# Patient Record
Sex: FEMALE | Race: WHITE | NOT HISPANIC OR LATINO | URBAN - METROPOLITAN AREA
[De-identification: names, ages, dates, MRNs, and addresses within clinical notes are randomized per-mention and may not be internally consistent; named-entity substitution may affect disease eponyms.]

---

## 2020-07-10 ENCOUNTER — OFFICE VISIT (OUTPATIENT)
Dept: URBAN - METROPOLITAN AREA CLINIC 63 | Facility: CLINIC | Age: 85
End: 2020-07-10

## 2020-10-15 ENCOUNTER — OFFICE VISIT (OUTPATIENT)
Dept: URBAN - METROPOLITAN AREA CLINIC 121 | Facility: CLINIC | Age: 85
End: 2020-10-15

## 2021-03-26 ENCOUNTER — OFFICE VISIT (OUTPATIENT)
Dept: URBAN - METROPOLITAN AREA CLINIC 63 | Facility: CLINIC | Age: 86
End: 2021-03-26

## 2021-04-15 ENCOUNTER — OFFICE VISIT (OUTPATIENT)
Dept: URBAN - METROPOLITAN AREA SURGERY CENTER 4 | Facility: SURGERY CENTER | Age: 86
End: 2021-04-15

## 2021-04-22 ENCOUNTER — OFFICE VISIT (OUTPATIENT)
Dept: URBAN - METROPOLITAN AREA SURGERY CENTER 4 | Facility: SURGERY CENTER | Age: 86
End: 2021-04-22

## 2021-04-27 LAB — PATHOLOGY (INDENTED REPORT): (no result)

## 2021-04-30 ENCOUNTER — OFFICE VISIT (OUTPATIENT)
Dept: URBAN - METROPOLITAN AREA CLINIC 63 | Facility: CLINIC | Age: 86
End: 2021-04-30

## 2022-07-09 ENCOUNTER — TELEPHONE ENCOUNTER (OUTPATIENT)
Dept: URBAN - METROPOLITAN AREA CLINIC 121 | Facility: CLINIC | Age: 87
End: 2022-07-09

## 2022-07-09 RX ORDER — LORAZEPAM 2 MG/1
TABLET ORAL TWICE A DAY
Refills: 0 | OUTPATIENT
Start: 2018-07-23 | End: 2020-07-10

## 2022-07-09 RX ORDER — DIPHENOXYLATE HCL/ATROPINE 2.5-.025MG
TABLET ORAL
Refills: 0 | OUTPATIENT
Start: 2014-11-21 | End: 2016-02-17

## 2022-07-09 RX ORDER — LEVOTHYROXINE SODIUM 112 MCG
TABLET ORAL ONCE A DAY
Refills: 0 | OUTPATIENT
Start: 2018-05-18 | End: 2018-07-23

## 2022-07-09 RX ORDER — ESTROGENS, CONJUGATED 0.625 MG
TABLET ORAL
Refills: 0 | OUTPATIENT
Start: 2012-01-12 | End: 2014-10-31

## 2022-07-09 RX ORDER — ZOLPIDEM TARTRATE 12.5 MG
TABLET, EXTENDED RELEASE MULTIPHASE ORAL
Refills: 0 | OUTPATIENT
Start: 2014-11-21 | End: 2016-02-17

## 2022-07-09 RX ORDER — HYDROCODONE BITARTRATE AND ACETAMINOPHEN 5; 325 MG/1; MG/1
TABLET ORAL
Refills: 0 | OUTPATIENT
Start: 2021-03-26 | End: 2021-04-30

## 2022-07-09 RX ORDER — METOPROLOL SUCCINATE 100 MG/1
TABLET, EXTENDED RELEASE ORAL ONCE A DAY
Refills: 0 | OUTPATIENT
Start: 2018-07-23 | End: 2020-07-10

## 2022-07-09 RX ORDER — ALPRAZOLAM 0.5 MG
TABLET ORAL
Refills: 0 | OUTPATIENT
Start: 2009-11-03 | End: 2012-01-12

## 2022-07-09 RX ORDER — ASPIRIN 81 MG/1
TABLET, COATED ORAL
Refills: 0 | OUTPATIENT
Start: 2012-01-12 | End: 2014-11-21

## 2022-07-09 RX ORDER — LEVOTHYROXINE SODIUM 112 MCG
TABLET ORAL ONCE A DAY
Refills: 0 | OUTPATIENT
Start: 2018-07-23 | End: 2020-07-10

## 2022-07-09 RX ORDER — ESOMEPRAZOLE MAGNESIUM 40 MG
CAPSULE,DELAYED RELEASE (ENTERIC COATED) ORAL
Refills: 0 | OUTPATIENT
Start: 2009-11-03 | End: 2012-01-12

## 2022-07-09 RX ORDER — METOPROLOL SUCCINATE 25 MG/1
TABLET, EXTENDED RELEASE ORAL
Refills: 0 | OUTPATIENT
Start: 2021-03-21 | End: 2021-03-26

## 2022-07-09 RX ORDER — ASPIRIN 81 MG/1
TABLET, FILM COATED ORAL ONCE A DAY
Refills: 0 | OUTPATIENT
Start: 2021-03-26 | End: 2021-04-30

## 2022-07-09 RX ORDER — GABAPENTIN 100 MG/1
CAPSULE ORAL ONCE A DAY
Refills: 0 | OUTPATIENT
Start: 2016-02-17 | End: 2016-02-17

## 2022-07-09 RX ORDER — AMLODIPINE BESYLATE 5 MG/1
TABLET ORAL ONCE A DAY
Refills: 0 | OUTPATIENT
Start: 2020-07-08 | End: 2020-07-10

## 2022-07-09 RX ORDER — ASPIRIN 81 MG/1
TABLET, COATED ORAL
Refills: 0 | OUTPATIENT
Start: 2014-11-21 | End: 2016-02-17

## 2022-07-09 RX ORDER — SERTRALINE HCL 25 MG
TABLET ORAL
Refills: 0 | OUTPATIENT
Start: 2009-11-03 | End: 2012-01-12

## 2022-07-09 RX ORDER — FLUTICASONE PROPIONATE 50 UG/1
SPRAY, METERED NASAL
Refills: 0 | OUTPATIENT
End: 2021-03-26

## 2022-07-09 RX ORDER — HYDROCODONE BITARTRATE AND ACETAMINOPHEN 5; 325 MG/1; MG/1
TABLET ORAL
Refills: 0 | OUTPATIENT
End: 2021-03-26

## 2022-07-09 RX ORDER — ESOMEPRAZOLE MAGNESIUM 40 MG
CAPSULE,DELAYED RELEASE (ENTERIC COATED) ORAL
Refills: 0 | OUTPATIENT
Start: 2012-01-12 | End: 2014-11-21

## 2022-07-09 RX ORDER — PANTOPRAZOLE SODIUM 40 MG/1
TABLET, DELAYED RELEASE ORAL
Refills: 0 | OUTPATIENT
Start: 2021-03-12 | End: 2021-03-26

## 2022-07-09 RX ORDER — METOPROLOL SUCCINATE 100 MG/1
TABLET, EXTENDED RELEASE ORAL ONCE A DAY
Refills: 0 | OUTPATIENT
Start: 2018-05-18 | End: 2018-07-23

## 2022-07-09 RX ORDER — VALSARTAN 160 MG/1
TABLET ORAL
Refills: 0 | OUTPATIENT
Start: 2014-11-21 | End: 2015-12-07

## 2022-07-09 RX ORDER — FUROSEMIDE 40 MG/1
TABLET ORAL AS NEEDED
Refills: 0 | OUTPATIENT
Start: 2018-07-23 | End: 2020-07-10

## 2022-07-09 RX ORDER — ESTROGENS, CONJUGATED 0.625 MG
TABLET ORAL
Refills: 0 | OUTPATIENT
Start: 2009-11-03 | End: 2012-01-12

## 2022-07-09 RX ORDER — ESOMEPRAZOLE MAGNESIUM 40 MG
CAPSULE,DELAYED RELEASE (ENTERIC COATED) ORAL TWICE A DAY
Refills: 0 | OUTPATIENT
Start: 2016-02-17 | End: 2018-05-18

## 2022-07-09 RX ORDER — METOPROLOL SUCCINATE 100 MG/1
TABLET, EXTENDED RELEASE ORAL
Refills: 0 | OUTPATIENT
Start: 2014-11-21 | End: 2016-02-17

## 2022-07-09 RX ORDER — LISINOPRIL 5 MG/1
TABLET ORAL
Refills: 0 | OUTPATIENT
Start: 2021-03-26 | End: 2021-03-26

## 2022-07-09 RX ORDER — FUROSEMIDE 40 MG/1
TABLET ORAL AS NEEDED
Refills: 0 | OUTPATIENT
Start: 2018-05-18 | End: 2018-07-23

## 2022-07-09 RX ORDER — FUROSEMIDE 40 MG/1
TABLET ORAL ONCE A DAY
Refills: 0 | OUTPATIENT
Start: 2014-11-21 | End: 2016-02-17

## 2022-07-09 RX ORDER — FUROSEMIDE 40 MG/1
TABLET ORAL ONCE A DAY
Refills: 0 | OUTPATIENT
Start: 2013-12-17 | End: 2014-11-21

## 2022-07-09 RX ORDER — HYDROXYZINE HYDROCHLORIDE 25 MG/1
TABLET, FILM COATED ORAL
Refills: 0 | OUTPATIENT
Start: 2021-03-08 | End: 2021-03-26

## 2022-07-09 RX ORDER — VALSARTAN 80 MG
TABLET ORAL ONCE A DAY
Refills: 0 | OUTPATIENT
Start: 2016-02-17 | End: 2018-05-18

## 2022-07-09 RX ORDER — SUCRALFATE 1 G/1
TABLET ORAL
Refills: 0 | OUTPATIENT
Start: 2021-03-17 | End: 2021-03-26

## 2022-07-09 RX ORDER — GUAIFEN/P-PROPANOLAMIN/CODEINE
EXPECTORANT ORAL
Refills: 0 | OUTPATIENT
Start: 2009-11-03 | End: 2012-01-12

## 2022-07-09 RX ORDER — METOPROLOL SUCCINATE 25 MG/1
TABLET, EXTENDED RELEASE ORAL
Refills: 0 | OUTPATIENT
Start: 2021-03-26 | End: 2021-04-30

## 2022-07-09 RX ORDER — TOLTERODINE TARTRATE 4 MG/1
CAPSULE, EXTENDED RELEASE ORAL ONCE A DAY
Refills: 0 | OUTPATIENT
Start: 2016-02-17 | End: 2018-05-18

## 2022-07-09 RX ORDER — METOPROLOL SUCCINATE 100 MG/1
TABLET, EXTENDED RELEASE ORAL ONCE A DAY
Refills: 0 | OUTPATIENT
Start: 2016-02-17 | End: 2018-05-18

## 2022-07-09 RX ORDER — LEVOTHYROXINE SODIUM 100 UG/1
TABLET ORAL
Refills: 0 | OUTPATIENT
Start: 2021-03-21 | End: 2021-03-26

## 2022-07-09 RX ORDER — TOLTERODINE TARTRATE 4 MG/1
CAPSULE, EXTENDED RELEASE ORAL ONCE A DAY
Refills: 0 | OUTPATIENT
Start: 2013-12-17 | End: 2014-11-21

## 2022-07-09 RX ORDER — HYDROXYZINE HYDROCHLORIDE 25 MG/1
TABLET, FILM COATED ORAL
Refills: 0 | OUTPATIENT
Start: 2020-06-03 | End: 2020-07-10

## 2022-07-09 RX ORDER — VALSARTAN 80 MG
TABLET ORAL
Refills: 0 | OUTPATIENT
Start: 2015-12-07 | End: 2016-02-17

## 2022-07-09 RX ORDER — LORAZEPAM 2 MG/1
TABLET ORAL TWICE A DAY
Refills: 0 | OUTPATIENT
Start: 2018-05-18 | End: 2018-07-23

## 2022-07-09 RX ORDER — LEVOTHYROXINE SODIUM 112 MCG
TABLET ORAL ONCE A DAY
Refills: 0 | OUTPATIENT
Start: 2013-12-17 | End: 2014-11-21

## 2022-07-09 RX ORDER — DIPHENOXYLATE HCL/ATROPINE 2.5-.025MG
TABLET ORAL AS NEEDED
Refills: 0 | OUTPATIENT
Start: 2016-02-17 | End: 2018-05-18

## 2022-07-09 RX ORDER — METOPROLOL SUCCINATE 25 MG/1
TABLET, EXTENDED RELEASE ORAL
Refills: 0 | OUTPATIENT
Start: 2020-06-24 | End: 2020-07-10

## 2022-07-09 RX ORDER — IPRATROPIUM BROMIDE AND ALBUTEROL 20; 100 UG/1; UG/1
SPRAY, METERED RESPIRATORY (INHALATION)
Refills: 0 | OUTPATIENT
Start: 2020-05-07 | End: 2020-07-10

## 2022-07-09 RX ORDER — LORAZEPAM 2 MG/1
TABLET ORAL TWICE A DAY
Refills: 0 | OUTPATIENT
Start: 2021-03-26 | End: 2021-04-30

## 2022-07-09 RX ORDER — TRAZODONE HYDROCHLORIDE 50 MG/1
TABLET ORAL
Refills: 0 | OUTPATIENT
Start: 2018-05-18 | End: 2018-07-23

## 2022-07-09 RX ORDER — LEVOTHYROXINE SODIUM 112 MCG
TABLET ORAL ONCE A DAY
Refills: 0 | OUTPATIENT
Start: 2014-11-21 | End: 2016-02-17

## 2022-07-09 RX ORDER — GABAPENTIN 100 MG/1
CAPSULE ORAL
Refills: 3 | OUTPATIENT
Start: 2021-03-26 | End: 2021-04-30

## 2022-07-09 RX ORDER — LEVOTHYROXINE SODIUM 125 UG/1
TABLET ORAL
Refills: 0 | OUTPATIENT
Start: 2012-01-12 | End: 2013-12-17

## 2022-07-09 RX ORDER — ASPIRIN 81 MG/1
TABLET, FILM COATED ORAL
Refills: 0 | OUTPATIENT
Start: 2018-05-18 | End: 2018-07-23

## 2022-07-09 RX ORDER — LORAZEPAM 2 MG/1
TABLET ORAL TWICE A DAY
Refills: 0 | OUTPATIENT
Start: 2016-02-17 | End: 2018-05-18

## 2022-07-09 RX ORDER — ASPIRIN 81 MG/1
TABLET, FILM COATED ORAL ONCE A DAY
Refills: 0 | OUTPATIENT
Start: 2018-07-23 | End: 2020-07-10

## 2022-07-09 RX ORDER — PANTOPRAZOLE SODIUM 40 MG/1
GRANULE, DELAYED RELEASE ORAL
Refills: 0 | OUTPATIENT
Start: 2020-06-03 | End: 2020-07-10

## 2022-07-09 RX ORDER — ZOLPIDEM TARTRATE 12.5 MG
TABLET, EXTENDED RELEASE MULTIPHASE ORAL
Refills: 0 | OUTPATIENT
Start: 2012-01-12 | End: 2014-11-21

## 2022-07-09 RX ORDER — ESOMEPRAZOLE MAGNESIUM 40 MG
CAPSULE,DELAYED RELEASE (ENTERIC COATED) ORAL
Refills: 0 | OUTPATIENT
Start: 2014-11-21 | End: 2016-02-17

## 2022-07-09 RX ORDER — TOLTERODINE TARTRATE 4 MG/1
CAPSULE, EXTENDED RELEASE ORAL ONCE A DAY
Refills: 0 | OUTPATIENT
Start: 2014-11-21 | End: 2016-02-17

## 2022-07-09 RX ORDER — LORAZEPAM 2 MG/1
TABLET ORAL TWICE A DAY
Refills: 0 | OUTPATIENT
Start: 2013-12-17 | End: 2014-11-21

## 2022-07-09 RX ORDER — CHOLESTYRAMINE 4 G/5.5G
POWDER, FOR SUSPENSION ORAL
Refills: 0 | OUTPATIENT
Start: 2020-06-18 | End: 2020-07-10

## 2022-07-09 RX ORDER — ATORVASTATIN CALCIUM 40 MG/1
TABLET, FILM COATED ORAL TAKE AS DIRECTED
Refills: 0 | OUTPATIENT
Start: 2020-01-23 | End: 2021-03-25

## 2022-07-09 RX ORDER — HYDROCODONE BITARTRATE AND ACETAMINOPHEN 325; 10 MG/1; MG/1
TABLET ORAL
Refills: 0 | OUTPATIENT
Start: 2020-05-29 | End: 2020-07-10

## 2022-07-09 RX ORDER — DIPHENOXYLATE HCL/ATROPINE 2.5-.025MG
TABLET ORAL AS NEEDED
Refills: 0 | OUTPATIENT
Start: 2018-07-23 | End: 2020-07-10

## 2022-07-09 RX ORDER — LISINOPRIL 5 MG/1
TABLET ORAL
Refills: 0 | OUTPATIENT
Start: 2017-03-11 | End: 2021-03-25

## 2022-07-09 RX ORDER — DIPHENOXYLATE HCL/ATROPINE 2.5-.025MG
TABLET ORAL
Refills: 0 | OUTPATIENT
Start: 2012-01-12 | End: 2014-11-21

## 2022-07-09 RX ORDER — ASPIRIN 81 MG/1
TABLET, COATED ORAL ONCE A DAY
Refills: 0 | OUTPATIENT
Start: 2016-02-17 | End: 2018-05-18

## 2022-07-09 RX ORDER — VALSARTAN 160 MG/1
TABLET ORAL
Refills: 0 | OUTPATIENT
Start: 2012-01-12 | End: 2014-11-21

## 2022-07-09 RX ORDER — LORAZEPAM 2 MG/1
TABLET ORAL
Refills: 0 | OUTPATIENT
Start: 2012-01-12 | End: 2013-12-17

## 2022-07-09 RX ORDER — ATORVASTATIN CALCIUM 40 MG/1
TABLET, FILM COATED ORAL TAKE AS DIRECTED
Refills: 0 | OUTPATIENT
Start: 2021-03-26 | End: 2021-04-30

## 2022-07-09 RX ORDER — TOLTERODINE TARTRATE 4 MG/1
CAPSULE, EXTENDED RELEASE ORAL
Refills: 0 | OUTPATIENT
End: 2021-03-25

## 2022-07-09 RX ORDER — LEVOTHYROXINE SODIUM 112 MCG
TABLET ORAL ONCE A DAY
Refills: 0 | OUTPATIENT
Start: 2016-02-17 | End: 2018-05-18

## 2022-07-09 RX ORDER — LORAZEPAM 2 MG/1
TABLET ORAL TWICE A DAY
Refills: 0 | OUTPATIENT
Start: 2021-03-25 | End: 2021-03-26

## 2022-07-09 RX ORDER — GABAPENTIN 100 MG/1
CAPSULE ORAL
Refills: 3 | OUTPATIENT
Start: 2016-02-10 | End: 2021-03-26

## 2022-07-09 RX ORDER — TRAZODONE HYDROCHLORIDE 50 MG/1
TABLET ORAL
Refills: 0 | OUTPATIENT
Start: 2018-07-23 | End: 2020-07-10

## 2022-07-09 RX ORDER — FUROSEMIDE 40 MG/1
TABLET ORAL AS NEEDED
Refills: 0 | OUTPATIENT
Start: 2016-02-17 | End: 2018-05-18

## 2022-07-09 RX ORDER — DIPHENOXYLATE HCL/ATROPINE 2.5-.025MG
TABLET ORAL AS NEEDED
Refills: 0 | OUTPATIENT
Start: 2018-05-18 | End: 2018-07-23

## 2022-07-09 RX ORDER — ATORVASTATIN CALCIUM 40 MG/1
TABLET, FILM COATED ORAL TAKE AS DIRECTED
Refills: 0 | OUTPATIENT
Start: 2021-03-25 | End: 2021-03-26

## 2022-07-09 RX ORDER — FLUTICASONE PROPIONATE 50 UG/1
SPRAY, METERED NASAL
Refills: 0 | OUTPATIENT
Start: 2021-03-26 | End: 2021-04-30

## 2022-07-09 RX ORDER — METOPROLOL SUCCINATE 100 MG/1
TABLET, EXTENDED RELEASE ORAL
Refills: 0 | OUTPATIENT
Start: 2012-01-12 | End: 2014-11-21

## 2022-07-09 RX ORDER — ONDANSETRON 4 MG/1
UP TO THREE TIMES A DAY, PO, AS NEEDED FOR NAUSEA TABLET, ORALLY DISINTEGRATING ORAL THREE TIMES A DAY
Refills: 1 | OUTPATIENT
Start: 2020-07-10 | End: 2021-03-25

## 2022-07-09 RX ORDER — LORAZEPAM 0.5 MG/1
TABLET ORAL
Refills: 0 | OUTPATIENT
Start: 2009-11-03 | End: 2012-01-12

## 2022-07-09 RX ORDER — LEVOTHYROXINE SODIUM 100 UG/1
TABLET ORAL
Refills: 0 | OUTPATIENT
Start: 2021-03-26 | End: 2021-04-30

## 2022-07-09 RX ORDER — FUROSEMIDE 20 MG/1
TABLET ORAL
Refills: 0 | OUTPATIENT
Start: 2020-05-21 | End: 2020-07-10

## 2022-07-09 RX ORDER — CYCLOBENZAPRINE HYDROCHLORIDE 5 MG/1
TABLET, FILM COATED ORAL
Refills: 0 | OUTPATIENT
Start: 2021-03-17 | End: 2021-03-26

## 2022-07-09 RX ORDER — ASPIRIN 81 MG/1
TABLET, COATED ORAL
Refills: 0 | OUTPATIENT
Start: 2009-11-03 | End: 2012-01-12

## 2022-07-09 RX ORDER — LORAZEPAM 2 MG/1
TABLET ORAL TWICE A DAY
Refills: 0 | OUTPATIENT
Start: 2020-07-10 | End: 2021-03-25

## 2022-07-09 RX ORDER — LEVOTHYROXINE SODIUM 100 UG/1
TABLET ORAL ONCE A DAY
Refills: 0 | OUTPATIENT
Start: 2020-06-24 | End: 2020-07-10

## 2022-07-09 RX ORDER — GABAPENTIN 100 MG/1
CAPSULE ORAL
Refills: 0 | OUTPATIENT
Start: 2016-02-17 | End: 2018-05-18

## 2022-07-09 RX ORDER — VALSARTAN 160 MG/1
TABLET ORAL
Refills: 0 | OUTPATIENT
Start: 2009-11-03 | End: 2012-01-12

## 2022-07-09 RX ORDER — PANTOPRAZOLE SODIUM 40 MG/1
TABLET, DELAYED RELEASE ORAL
Refills: 0 | OUTPATIENT
Start: 2018-07-04 | End: 2021-03-25

## 2022-07-09 RX ORDER — TOLTERODINE TARTRATE 1 MG/1
TABLET, FILM COATED ORAL
Refills: 0 | OUTPATIENT
Start: 2009-11-03 | End: 2012-01-12

## 2022-07-09 RX ORDER — SUCRALFATE 1 G/1
TABLET ORAL
Refills: 0 | OUTPATIENT
Start: 2018-07-04 | End: 2021-03-25

## 2022-07-09 RX ORDER — CEFDINIR 300 MG/1
CAPSULE ORAL
Refills: 0 | OUTPATIENT
Start: 2020-06-18 | End: 2020-07-10

## 2022-07-09 RX ORDER — LISINOPRIL 5 MG/1
TABLET ORAL
Refills: 0 | OUTPATIENT
Start: 2021-03-25 | End: 2021-03-26

## 2022-07-09 RX ORDER — LORAZEPAM 2 MG/1
TABLET ORAL TWICE A DAY
Refills: 0 | OUTPATIENT
Start: 2014-11-21 | End: 2016-02-17

## 2022-07-09 RX ORDER — ASPIRIN 81 MG/1
TABLET, FILM COATED ORAL ONCE A DAY
Refills: 0 | OUTPATIENT
Start: 2020-07-10 | End: 2021-03-26

## 2022-07-09 RX ORDER — ZOLPIDEM TARTRATE 12.5 MG
TABLET, EXTENDED RELEASE MULTIPHASE ORAL
Refills: 0 | OUTPATIENT
Start: 2016-02-17 | End: 2018-05-18

## 2022-07-09 RX ORDER — AMOXICILLIN 500 MG/1
TABLET, FILM COATED ORAL
Refills: 0 | OUTPATIENT
Start: 2014-11-21 | End: 2016-02-17

## 2022-07-10 ENCOUNTER — TELEPHONE ENCOUNTER (OUTPATIENT)
Dept: URBAN - METROPOLITAN AREA CLINIC 121 | Facility: CLINIC | Age: 87
End: 2022-07-10

## 2022-07-10 RX ORDER — AMLODIPINE BESYLATE 5 MG/1
TABLET ORAL ONCE A DAY
Refills: 0 | Status: ACTIVE | COMMUNITY
Start: 2020-07-10

## 2022-07-10 RX ORDER — SUCRALFATE 1 G/10ML
10ML AC/HS SUSPENSION ORAL
Refills: 2 | Status: ACTIVE | COMMUNITY
Start: 2012-01-12

## 2022-07-10 RX ORDER — DICLOFENAC SODIUM TOPICAL GEL, 1%, 10 MG/G
GEL TOPICAL
Refills: 5 | Status: ACTIVE | COMMUNITY
Start: 2017-11-02

## 2022-07-10 RX ORDER — HYDROCODONE BITARTRATE AND ACETAMINOPHEN 325; 10 MG/1; MG/1
TABLET ORAL
Refills: 0 | Status: ACTIVE | COMMUNITY
Start: 2020-07-10

## 2022-07-10 RX ORDER — GABAPENTIN 100 MG/1
CAPSULE ORAL
Refills: 3 | Status: ACTIVE | COMMUNITY
Start: 2021-04-30

## 2022-07-10 RX ORDER — METOPROLOL SUCCINATE 25 MG/1
TABLET, EXTENDED RELEASE ORAL
Refills: 0 | Status: ACTIVE | COMMUNITY
Start: 2020-07-10

## 2022-07-10 RX ORDER — LEVOTHYROXINE SODIUM 100 UG/1
TABLET ORAL
Refills: 0 | Status: ACTIVE | COMMUNITY
Start: 2021-04-30

## 2022-07-10 RX ORDER — HYDROCORTISONE ACETATE 25 MG
TWICE A DAY, PR, X14 DAYS SUPPOSITORY, RECTAL RECTAL TWICE A DAY
Refills: 2 | Status: ACTIVE | COMMUNITY
Start: 2021-04-30

## 2022-07-10 RX ORDER — ATORVASTATIN CALCIUM 40 MG/1
TABLET, FILM COATED ORAL TAKE AS DIRECTED
Refills: 0 | Status: ACTIVE | COMMUNITY
Start: 2021-04-30

## 2022-07-10 RX ORDER — FLUTICASONE PROPIONATE 50 UG/1
SPRAY, METERED NASAL
Refills: 0 | Status: ACTIVE | COMMUNITY
Start: 2021-04-30

## 2022-07-10 RX ORDER — ESOMEPRAZOLE MAGNESIUM 40 MG
CAPSULE,DELAYED RELEASE (ENTERIC COATED) ORAL TWICE A DAY
Refills: 3 | Status: ACTIVE | COMMUNITY
Start: 2006-03-09

## 2022-07-10 RX ORDER — CYCLOBENZAPRINE HYDROCHLORIDE 5 MG/1
TABLET, FILM COATED ORAL
Refills: 0 | Status: ACTIVE | COMMUNITY
Start: 2021-03-26

## 2022-07-10 RX ORDER — ASPIRIN 81 MG/1
TABLET, FILM COATED ORAL ONCE A DAY
Refills: 0 | Status: ACTIVE | COMMUNITY
Start: 2021-04-30

## 2022-07-10 RX ORDER — HYDROCODONE BITARTRATE AND ACETAMINOPHEN 5; 325 MG/1; MG/1
TABLET ORAL
Refills: 0 | Status: ACTIVE | COMMUNITY
Start: 2021-04-30

## 2022-07-10 RX ORDER — PANTOPRAZOLE SODIUM 40 MG/1
TABLET, DELAYED RELEASE ORAL
Refills: 0 | Status: ACTIVE | COMMUNITY
Start: 2021-03-26

## 2022-07-10 RX ORDER — HYDROXYZINE HYDROCHLORIDE 25 MG/1
TABLET, FILM COATED ORAL
Refills: 0 | Status: ACTIVE | COMMUNITY
Start: 2021-03-26

## 2022-07-10 RX ORDER — METOPROLOL SUCCINATE 25 MG/1
TABLET, EXTENDED RELEASE ORAL
Refills: 0 | Status: ACTIVE | COMMUNITY
Start: 2021-04-30

## 2022-07-10 RX ORDER — PANTOPRAZOLE SODIUM 40 MG/1
GRANULE, DELAYED RELEASE ORAL ONCE A DAY
Refills: 0 | Status: ACTIVE | COMMUNITY
Start: 2020-07-10

## 2022-07-10 RX ORDER — SUCRALFATE 1 G/10ML
AC/HS 1 GM=10ML SUSPENSION ORAL
Refills: 3 | Status: ACTIVE | COMMUNITY
Start: 2006-03-09

## 2022-07-10 RX ORDER — LORAZEPAM 2 MG/1
TABLET ORAL TWICE A DAY
Refills: 0 | Status: ACTIVE | COMMUNITY
Start: 2021-04-30

## 2022-07-10 RX ORDER — FIDAXOMICIN 200 MG/1
TAKE AS DIRECTED TAKE ONE TAB TWICE DAILY X5 DAYS, THEN 1 TAB EOD, STARTING ON DAY 7, UNTIL COMPLETED TABLET, FILM COATED ORAL TAKE AS DIRECTED
Refills: 0 | Status: ACTIVE | COMMUNITY
Start: 2021-04-08

## 2022-07-10 RX ORDER — FUROSEMIDE 20 MG/1
TABLET ORAL
Refills: 0 | Status: ACTIVE | COMMUNITY
Start: 2020-07-10

## 2022-07-10 RX ORDER — LEVOTHYROXINE SODIUM 100 UG/1
TABLET ORAL ONCE A DAY
Refills: 0 | Status: ACTIVE | COMMUNITY
Start: 2020-07-10

## 2022-07-10 RX ORDER — SUCRALFATE 1 G/1
TABLET ORAL
Refills: 0 | Status: ACTIVE | COMMUNITY
Start: 2021-03-26

## 2022-08-23 ENCOUNTER — CLAIMS CREATED FROM THE CLAIM WINDOW (OUTPATIENT)
Dept: URBAN - METROPOLITAN AREA MEDICAL CENTER 14 | Facility: MEDICAL CENTER | Age: 87
End: 2022-08-23
Payer: MEDICARE

## 2022-08-23 DIAGNOSIS — K80.50 CALCULUS OF BILE DUCT WITHOUT CHOLANGITIS OR BILIARY OBSTRUCTION: ICD-10-CM

## 2022-08-23 DIAGNOSIS — K21.9 ACID REFLUX: ICD-10-CM

## 2022-08-23 DIAGNOSIS — R55 SYNCOPAL EPISODES: ICD-10-CM

## 2022-08-23 DIAGNOSIS — R94.5 ABNORMAL LFT'S: ICD-10-CM

## 2022-08-23 DIAGNOSIS — R10.11 ABDOMINAL PAIN, RUQ: ICD-10-CM

## 2022-08-23 PROCEDURE — 43273 ENDOSCOPIC PANCREATOSCOPY: CPT | Performed by: INTERNAL MEDICINE

## 2022-08-23 PROCEDURE — 43260 ERCP W/SPECIMEN COLLECTION: CPT | Performed by: INTERNAL MEDICINE

## 2022-08-23 PROCEDURE — 99223 1ST HOSP IP/OBS HIGH 75: CPT | Performed by: INTERNAL MEDICINE

## 2022-08-23 PROCEDURE — 99232 SBSQ HOSP IP/OBS MODERATE 35: CPT | Performed by: INTERNAL MEDICINE

## 2022-08-27 ENCOUNTER — CLAIMS CREATED FROM THE CLAIM WINDOW (OUTPATIENT)
Dept: URBAN - METROPOLITAN AREA MEDICAL CENTER 14 | Facility: MEDICAL CENTER | Age: 87
End: 2022-08-27
Payer: MEDICARE

## 2022-08-27 DIAGNOSIS — R94.5 ABNORMAL LFT'S: ICD-10-CM

## 2022-08-27 DIAGNOSIS — K85.90 ACUTE PANCREATITIS: ICD-10-CM

## 2022-08-27 PROCEDURE — 99232 SBSQ HOSP IP/OBS MODERATE 35: CPT | Performed by: NURSE PRACTITIONER

## 2022-08-29 ENCOUNTER — CLAIMS CREATED FROM THE CLAIM WINDOW (OUTPATIENT)
Dept: URBAN - METROPOLITAN AREA MEDICAL CENTER 14 | Facility: MEDICAL CENTER | Age: 87
End: 2022-08-29
Payer: MEDICARE

## 2022-08-29 DIAGNOSIS — K85.90 ACUTE PANCREATITIS: ICD-10-CM

## 2022-08-29 DIAGNOSIS — R94.5 ABNORMAL LFT'S: ICD-10-CM

## 2022-08-29 PROCEDURE — 99233 SBSQ HOSP IP/OBS HIGH 50: CPT | Performed by: NURSE PRACTITIONER

## 2022-12-14 PROBLEM — 698065002 ACID REFLUX: Status: ACTIVE | Noted: 2022-12-14

## 2022-12-20 ENCOUNTER — CLAIMS CREATED FROM THE CLAIM WINDOW (OUTPATIENT)
Dept: URBAN - METROPOLITAN AREA MEDICAL CENTER 14 | Facility: MEDICAL CENTER | Age: 87
End: 2022-12-20
Payer: MEDICARE

## 2022-12-20 DIAGNOSIS — R79.89 ELEVATED LFTS: ICD-10-CM

## 2022-12-20 DIAGNOSIS — R10.9 ABDOMINAL PAIN: ICD-10-CM

## 2022-12-20 DIAGNOSIS — R53.1 WEAKNESS AND FATIGUE: ICD-10-CM

## 2022-12-20 DIAGNOSIS — R74.01 TRANSAMINITIS: ICD-10-CM

## 2022-12-20 DIAGNOSIS — N17.9 ACUTE KIDNEY INJURY: ICD-10-CM

## 2022-12-20 PROCEDURE — 99223 1ST HOSP IP/OBS HIGH 75: CPT | Performed by: NURSE PRACTITIONER

## 2022-12-20 PROCEDURE — 99233 SBSQ HOSP IP/OBS HIGH 50: CPT | Performed by: NURSE PRACTITIONER

## 2023-01-13 ENCOUNTER — OFFICE VISIT (OUTPATIENT)
Dept: URBAN - METROPOLITAN AREA CLINIC 63 | Facility: CLINIC | Age: 88
End: 2023-01-13

## 2023-01-13 RX ORDER — ATORVASTATIN CALCIUM 40 MG/1
TABLET, FILM COATED ORAL
Qty: 90 TABLET | Status: ACTIVE | COMMUNITY

## 2023-01-13 RX ORDER — NITROFURANTOIN 25; 75 MG/1; MG/1
CAPSULE ORAL
Qty: 10 CAPSULE | Status: ACTIVE | COMMUNITY

## 2023-01-13 RX ORDER — POTASSIUM CHLORIDE 750 MG/1
TABLET, FILM COATED, EXTENDED RELEASE ORAL
Qty: 90 TABLET | Status: ACTIVE | COMMUNITY

## 2023-01-13 RX ORDER — METOPROLOL SUCCINATE 100 MG/1
TABLET, EXTENDED RELEASE ORAL
Qty: 90 TABLET | Status: ACTIVE | COMMUNITY

## 2023-01-13 RX ORDER — FLUOXETINE 10 MG/1
CAPSULE ORAL
Qty: 90 CAPSULE | Status: ACTIVE | COMMUNITY

## 2023-01-13 RX ORDER — LORAZEPAM 2 MG/1
TABLET ORAL
Qty: 90 TABLET | Status: ACTIVE | COMMUNITY

## 2023-01-13 RX ORDER — LEVOTHYROXINE SODIUM 112 UG/1
CAPSULE ORAL
Qty: 90 CAPSULE | Status: ACTIVE | COMMUNITY

## 2023-01-13 RX ORDER — CYCLOBENZAPRINE HYDROCHLORIDE 5 MG/1
TABLET, FILM COATED ORAL
Qty: 270 TABLET | Status: ACTIVE | COMMUNITY

## 2023-01-13 RX ORDER — LISINOPRIL 2.5 MG/1
TABLET ORAL
Qty: 90 TABLET | Status: ACTIVE | COMMUNITY

## 2023-01-13 RX ORDER — HYDROXYZINE HYDROCHLORIDE 25 MG/1
TABLET, FILM COATED ORAL
Qty: 180 TABLET | Status: ACTIVE | COMMUNITY

## 2023-01-13 RX ORDER — HYDROCODONE BITARTRATE AND ACETAMINOPHEN 10; 325 MG/1; MG/1
TAKE 1 TABLET BY MOUTH EVERY 6 HOURS AS NEEDED FOR NON ACUTE PAIN TABLET ORAL
Qty: 120 EACH | Refills: 0 | Status: ACTIVE | COMMUNITY

## 2023-01-13 RX ORDER — CEPHALEXIN 500 MG/1
CAPSULE ORAL
Qty: 6 CAPSULE | Status: ACTIVE | COMMUNITY

## 2023-01-13 RX ORDER — FUROSEMIDE 20 MG/1
TABLET ORAL
Qty: 90 TABLET | Status: ACTIVE | COMMUNITY

## 2023-01-13 NOTE — HPI-PREVIOUS LABS
Lab work dated 18-21 December 2022 demonstrates the following abnormalities: RDW 14.7%, monocytes 14.3%, eosinophils 9.2%, absolute monocytes 1.1, absolute eosinophils 0.7, sodium 132, CO2 19, BUN 33, creatinine 1.10, BUN/creatinine ratio 30.0, total protein 6.2, albumin 3.4, alkaline phosphatase 417, AST 71, ALT 64, GFR 47.8, BNP 3010, troponin 156, urine protein trace, urine ketones 18, urine blood trace, leukocyte esterase trace, urine WBC 3-5, hyaline cast 1-4, CRP 4.0, JACQUIE positive/1:320 titer, urine culture <10,000 CFU per milliliter gram-negative rods.  All remaining lab values of CBC, CMP, PT/INR, lactic acid, CK, TSH, ASMA, SARS-CoV-2, influenza panel, RSV and urinalysis are negative or within normal limits. ********** Lab work dated 29 April 2021 demonstrates the following abnormalities: Hemoglobin 11.3, RDW 14.8%, iron >1200, TIBC 222, iron saturation >541%, ferritin 1720.  All remaining lab values of anemia panel are within normal limits. ********** Lab work dated 16 March 2021 demonstrates the following abnormalities: RBC 3.76, hemoglobin 11.0, hematocrit 34.5%, RDW 15%, absolute monocytes 1.1, BUN 34, creatinine 1.19, BUN/creatinine ratio 28.6, alkaline phosphatase 233, AST 60, GFR non--American 42.8, ALT 39.  All remaining lab values of CBC, CMP, PT/INR, troponins, and GI stool panel PCR are negative or within normal limits. ********** Lab work dated 18 June 2020 demonstrates the following abnormalities: Hemoglobin 11.6, hematocrit 34.9%, CO2 21, BUN 19, magnesium 1.5, GFR non-African-American 55.6.  All remaining lab values of CBC and BMP are within normal limits. ********** Stool studies dated 16 June 2020 are negative for infectious etiology. ********** Urinalysis dated 16 June 2020 demonstrates the following abnormalities: Urine color straw, urine protein 30, urine blood small, urine leukocyte esterase large, urine WBC >50, urine RBC 6-10, urine bacteria moderate.  All remaining lab values of urinalysis are negative or within normal limits.

## 2023-01-13 NOTE — HPI-PREVIOUS PROCEDURES
EGD with dilation/22 April 2021.  Nonobstructing Schatzki's ring was dilated with a 48 [place]ese Hutchinson dilator, encountering moderate resistance.  The dilation site was examined and showed moderate improvement in luminal narrowing.  A 6 cm hiatal hernia was present.  Minimal gastritis found in the gastric antrum.  Unremarkable duodenum.  Pathology demonstrates focal mild chronic gastritis in the antral biopsy and focal mild reflux type changes in the lower third esophageal biopsy.  All remaining findings are negative or benign. ********** EGD with dilation-flexible sigmoidoscopy/29 July 2018 (Johnny Mccullough DO). POSTOPERATIVE DIAGNOSIS: 1.  Mild antral gastritis.  2.  A 6 cm hiatal hernia.  3.  Grade B distal esophagitis.  4.  Normal flexible sigmoidoscopy.  (54 [place]ese Hutchinson dilator with was advanced through the esophagus easily without restriction.) PLAN: At the present time, we will advance the patient's diet and we will await biopsies to see how she does with dilation. PATHOLOGY: Antral biopsy demonstrates mild chronic gastritis.  Sigmoid colon biopsy demonstrates focal acute inflammation without definitive dysplasia or malignancy.  All remaining findings are negative or benign. ********** EGD with dilation/23 August 2018.  LA grade B esophagitis.  An obstructing Schatzki's ring was dilated with 48 [place]ese Hutchinson dilator, encountering mild resistance.  A 5 cm hiatal hernia was present.  Mild inflammation was found in the gastric antrum.  Unremarkable duodenum.  Pathology demonstrates reactive gastropathy with all remaining findings negative are benign. ********** Colonoscopy/23 August 2018.  Unremarkable terminal ileum.  7 mm hyperplastic polyp removed from the distal sigmoid colon.  Diverticulosis in the descending and sigmoid colon with internal hemorrhoids present.  All remaining findings are negative are benign.  Repeat colonoscopy is not recommended due to age.

## 2023-01-13 NOTE — HPI-PREVIOUS IMAGING
CT abdomen and pelvis without contrast/18 December 2022.  No evidence of acute abdominal or pelvic pathology. ********** PIPIDA scan with ejection fraction/08 July 2020.  No scintigraphic evidence of acute or chronic cholecystitis.  Gallbladder ejection fraction is 78%. ********** CT abdomen and pelvis with contrast/16 June 2020.  1.  There is a moderate sized hiatal hernia.  2.  There are a few scattered diverticula in the colon.  There is no definite evidence of diverticulitis.

## 2023-01-13 NOTE — HPI-TODAY'S VISIT:
Pt likes to be looked at when spoken to. [facility]; [place] tubular adenoma (2010), hyperplastic (2018)  PMH: CAD-NSTEMI (PCI with stent x3, 2017), HTN, HLD, COPD, pneumonia x2, CKD 3, vertigo, anxiety, hypothyroid, osteoarthritis, GERD, hiatal hernia, diverticulosis, C. difficile colitis PSH: Hysterectomy, knee, tonsillectomy  As per [facility] discharge summary dated 21 December 2022 (Julián Palomo MD): "Patient is a 89 y.o.  female came into the ER with fever chills and hypotension.  Monitor noted to have UTI.  Patient's urine culture with GNR.  Started on IV antibiotics.  Noted to have mild nonspecific troponin elevation.  Continue conservative treatment.  No cardiac work-up needed.  Also seen by GI for chronic elevation of LFT.  Labs to rule out PBC were ordered.  The results are still pending.  Okay to DC per GI.  Follow-up with GI as an outpatient.  Patient will be discharged in stable condition.  Patient noted to have GUNNER which resolved."

## 2023-01-27 ENCOUNTER — WEB ENCOUNTER (OUTPATIENT)
Dept: URBAN - METROPOLITAN AREA CLINIC 57 | Facility: CLINIC | Age: 88
End: 2023-01-27

## 2023-01-27 ENCOUNTER — DASHBOARD ENCOUNTERS (OUTPATIENT)
Age: 88
End: 2023-01-27

## 2023-01-27 ENCOUNTER — OFFICE VISIT (OUTPATIENT)
Dept: URBAN - METROPOLITAN AREA CLINIC 63 | Facility: CLINIC | Age: 88
End: 2023-01-27
Payer: MEDICARE

## 2023-01-27 VITALS
DIASTOLIC BLOOD PRESSURE: 64 MMHG | SYSTOLIC BLOOD PRESSURE: 120 MMHG | BODY MASS INDEX: 18.16 KG/M2 | TEMPERATURE: 97.5 F | HEART RATE: 67 BPM | WEIGHT: 109 LBS | OXYGEN SATURATION: 98 % | HEIGHT: 65 IN

## 2023-01-27 DIAGNOSIS — R74.8 ELEVATED ALKALINE PHOSPHATASE LEVEL: ICD-10-CM

## 2023-01-27 DIAGNOSIS — K74.3 PRIMARY BILIARY CHOLANGITIS: ICD-10-CM

## 2023-01-27 DIAGNOSIS — R74.01 TRANSAMINITIS: ICD-10-CM

## 2023-01-27 PROBLEM — 31712002: Status: ACTIVE | Noted: 2023-01-27

## 2023-01-27 PROCEDURE — 99214 OFFICE O/P EST MOD 30 MIN: CPT | Performed by: NURSE PRACTITIONER

## 2023-01-27 RX ORDER — PANTOPRAZOLE SODIUM 40 MG/1
GRANULE, DELAYED RELEASE ORAL ONCE A DAY
Refills: 0 | Status: ACTIVE | COMMUNITY
Start: 2020-07-10

## 2023-01-27 RX ORDER — CYCLOBENZAPRINE HYDROCHLORIDE 5 MG/1
TABLET, FILM COATED ORAL
Qty: 270 TABLET | Status: ACTIVE | COMMUNITY

## 2023-01-27 RX ORDER — ASPIRIN 81 MG/1
TABLET, FILM COATED ORAL ONCE A DAY
Refills: 0 | Status: ACTIVE | COMMUNITY
Start: 2021-04-30

## 2023-01-27 RX ORDER — FUROSEMIDE 20 MG/1
TABLET ORAL
Refills: 0 | Status: ACTIVE | COMMUNITY
Start: 2020-07-10

## 2023-01-27 RX ORDER — CEPHALEXIN 500 MG/1
CAPSULE ORAL
Qty: 6 CAPSULE | Status: ACTIVE | COMMUNITY

## 2023-01-27 RX ORDER — METOPROLOL SUCCINATE 25 MG/1
TABLET, EXTENDED RELEASE ORAL
Refills: 0 | Status: ACTIVE | COMMUNITY
Start: 2021-04-30

## 2023-01-27 RX ORDER — GABAPENTIN 100 MG/1
CAPSULE ORAL
Refills: 3 | Status: ACTIVE | COMMUNITY
Start: 2021-04-30

## 2023-01-27 RX ORDER — HYDROCORTISONE ACETATE 25 MG
TWICE A DAY, PR, X14 DAYS SUPPOSITORY, RECTAL RECTAL TWICE A DAY
Refills: 2 | Status: ACTIVE | COMMUNITY
Start: 2021-04-30

## 2023-01-27 RX ORDER — HYDROCODONE BITARTRATE AND ACETAMINOPHEN 5; 325 MG/1; MG/1
TABLET ORAL
Refills: 0 | Status: ACTIVE | COMMUNITY
Start: 2021-04-30

## 2023-01-27 RX ORDER — SUCRALFATE 1 G/1
TABLET ORAL
Refills: 0 | Status: ACTIVE | COMMUNITY
Start: 2021-03-26

## 2023-01-27 RX ORDER — HYDROXYZINE HYDROCHLORIDE 25 MG/1
TABLET, FILM COATED ORAL
Qty: 180 TABLET | Status: ACTIVE | COMMUNITY

## 2023-01-27 RX ORDER — AMLODIPINE BESYLATE 5 MG/1
TABLET ORAL ONCE A DAY
Refills: 0 | Status: ACTIVE | COMMUNITY
Start: 2020-07-10

## 2023-01-27 RX ORDER — METOPROLOL SUCCINATE 100 MG/1
TABLET, EXTENDED RELEASE ORAL
Qty: 90 TABLET | Status: ACTIVE | COMMUNITY

## 2023-01-27 RX ORDER — HYDROXYZINE HYDROCHLORIDE 25 MG/1
TABLET, FILM COATED ORAL
Refills: 0 | Status: ACTIVE | COMMUNITY
Start: 2021-03-26

## 2023-01-27 RX ORDER — LORAZEPAM 2 MG/1
TABLET ORAL
Qty: 90 TABLET | Status: ACTIVE | COMMUNITY

## 2023-01-27 RX ORDER — SUCRALFATE 1 G/10ML
AC/HS 1 GM=10ML SUSPENSION ORAL
Refills: 3 | Status: ACTIVE | COMMUNITY
Start: 2006-03-09

## 2023-01-27 RX ORDER — LISINOPRIL 2.5 MG/1
TABLET ORAL
Qty: 90 TABLET | Status: ACTIVE | COMMUNITY

## 2023-01-27 RX ORDER — METOPROLOL SUCCINATE 25 MG/1
TABLET, EXTENDED RELEASE ORAL
Refills: 0 | Status: ACTIVE | COMMUNITY
Start: 2020-07-10

## 2023-01-27 RX ORDER — HYDROCODONE BITARTRATE AND ACETAMINOPHEN 10; 325 MG/1; MG/1
TAKE 1 TABLET BY MOUTH EVERY 6 HOURS AS NEEDED FOR NON ACUTE PAIN TABLET ORAL
Qty: 120 EACH | Refills: 0 | Status: ACTIVE | COMMUNITY

## 2023-01-27 RX ORDER — LEVOTHYROXINE SODIUM 112 UG/1
CAPSULE ORAL
Qty: 90 CAPSULE | Status: ACTIVE | COMMUNITY

## 2023-01-27 RX ORDER — DICLOFENAC SODIUM TOPICAL GEL, 1%, 10 MG/G
GEL TOPICAL
Refills: 5 | Status: ACTIVE | COMMUNITY
Start: 2017-11-02

## 2023-01-27 RX ORDER — LORAZEPAM 2 MG/1
TABLET ORAL TWICE A DAY
Refills: 0 | Status: ACTIVE | COMMUNITY
Start: 2021-04-30

## 2023-01-27 RX ORDER — FIDAXOMICIN 200 MG/1
TAKE AS DIRECTED TAKE ONE TAB TWICE DAILY X5 DAYS, THEN 1 TAB EOD, STARTING ON DAY 7, UNTIL COMPLETED TABLET, FILM COATED ORAL TAKE AS DIRECTED
Refills: 0 | Status: ACTIVE | COMMUNITY
Start: 2021-04-08

## 2023-01-27 RX ORDER — FLUOXETINE 10 MG/1
CAPSULE ORAL
Qty: 90 CAPSULE | Status: ACTIVE | COMMUNITY

## 2023-01-27 RX ORDER — ATORVASTATIN CALCIUM 40 MG/1
TABLET, FILM COATED ORAL
Qty: 90 TABLET | Status: ACTIVE | COMMUNITY

## 2023-01-27 RX ORDER — HYDROCODONE BITARTRATE AND ACETAMINOPHEN 325; 10 MG/1; MG/1
TABLET ORAL
Refills: 0 | Status: ACTIVE | COMMUNITY
Start: 2020-07-10

## 2023-01-27 RX ORDER — ESOMEPRAZOLE MAGNESIUM 40 MG
CAPSULE,DELAYED RELEASE (ENTERIC COATED) ORAL TWICE A DAY
Refills: 3 | Status: ACTIVE | COMMUNITY
Start: 2006-03-09

## 2023-01-27 RX ORDER — POTASSIUM CHLORIDE 750 MG/1
TABLET, FILM COATED, EXTENDED RELEASE ORAL
Qty: 90 TABLET | Status: ACTIVE | COMMUNITY

## 2023-01-27 RX ORDER — LEVOTHYROXINE SODIUM 100 UG/1
TABLET ORAL ONCE A DAY
Refills: 0 | Status: ACTIVE | COMMUNITY
Start: 2020-07-10

## 2023-01-27 RX ORDER — CYCLOBENZAPRINE HYDROCHLORIDE 5 MG/1
TABLET, FILM COATED ORAL
Refills: 0 | Status: ACTIVE | COMMUNITY
Start: 2021-03-26

## 2023-01-27 RX ORDER — FUROSEMIDE 20 MG/1
TABLET ORAL
Qty: 90 TABLET | Status: ACTIVE | COMMUNITY

## 2023-01-27 RX ORDER — ATORVASTATIN CALCIUM 40 MG/1
TABLET, FILM COATED ORAL TAKE AS DIRECTED
Refills: 0 | Status: ACTIVE | COMMUNITY
Start: 2021-04-30

## 2023-01-27 RX ORDER — FLUTICASONE PROPIONATE 50 UG/1
SPRAY, METERED NASAL
Refills: 0 | Status: ACTIVE | COMMUNITY
Start: 2021-04-30

## 2023-01-27 RX ORDER — NITROFURANTOIN 25; 75 MG/1; MG/1
CAPSULE ORAL
Qty: 10 CAPSULE | Status: ACTIVE | COMMUNITY

## 2023-01-27 RX ORDER — PANTOPRAZOLE SODIUM 40 MG/1
TABLET, DELAYED RELEASE ORAL
Refills: 0 | Status: ACTIVE | COMMUNITY
Start: 2021-03-26

## 2023-01-27 NOTE — HPI-PREVIOUS PROCEDURES
EGD with dilation/22 April 2021.  Nonobstructing Schatzki's ring was dilated with a 48 [place] Hutchinson dilator, encountering moderate resistance.  The dilation site was examined and showed moderate improvement in luminal narrowing.  A 6 cm hiatal hernia was present.  Minimal gastritis found in the gastric antrum.  Unremarkable duodenum.  Pathology demonstrates focal mild chronic gastritis in the antral biopsy and focal mild reflux type changes in the lower third esophageal biopsy.  All remaining findings are negative or benign. ********** EGD with dilation-flexible sigmoidoscopy/29 July 2018 (Johnny Mccullough DO). POSTOPERATIVE DIAGNOSIS: 1.  Mild antral gastritis.  2.  A 6 cm hiatal hernia.  3.  Grade B distal esophagitis.  4.  Normal flexible sigmoidoscopy.  (54 [place] Hutchinson dilator with was advanced through the esophagus easily without restriction.) PLAN: At the present time, we will advance the patient's diet and we will await biopsies to see how she does with dilation. PATHOLOGY: Antral biopsy demonstrates mild chronic gastritis.  Sigmoid colon biopsy demonstrates focal acute inflammation without definitive dysplasia or malignancy.  All remaining findings are negative or benign. ********** EGD with dilation/23 August 2018.  LA grade B esophagitis.  An obstructing Schatzki's ring was dilated with 48 [place] Hutchinson dilator, encountering mild resistance.  A 5 cm hiatal hernia was present.  Mild inflammation was found in the gastric antrum.  Unremarkable duodenum.  Pathology demonstrates reactive gastropathy with all remaining findings negative are benign. ********** Colonoscopy/23 August 2018.  Unremarkable terminal ileum.  7 mm hyperplastic polyp removed from the distal sigmoid colon.  Diverticulosis in the descending and sigmoid colon with internal hemorrhoids present.  All remaining findings are negative are benign.  Repeat colonoscopy is not recommended due to age. .

## 2023-01-27 NOTE — HPI-TODAY'S VISIT:
Thank you very much for kindly referring Cris Valdes, a very pleasant 89-year-old female, back to our service due to elevated liver enzymes.  Past medical history significant for CAD-NSTEMI (PCI with stents x3, 2017, HTN, HLD, COPD, pneumonia x2, CKD 3, vertigo, anxiety, hypothyroidism, osteoarthritis, GERD, hiatal hernia, diverticulosis and C. difficile colitis.  Past surgical history significant for hysterectomy, knee and tonsillectomy.  She was recently discharged from [facility] on 21 December 2022, and while there, was noted to have transaminitis and elevated alkaline phosphatase, which was believed to be secondary to recent antibiotic use.  CT scan without contrast demonstrated no solid lesions within the liver.  Cris presents today without gastrointestinal complaint and admits to 1-2 unremarkable bowel movements per day of soft brown stool.  She denies pyrosis, dysphagia, dyspepsia, abdominal pain, change in bowel habits, rectal bleeding, melena or unintentional weight loss.  **********  As per [facility] discharge summary dated 21 December 2022 (Julián Palomo MD): "Patient is a 89 y.o.  female came into the ER with fever chills and hypotension.  Monitor noted to have UTI.  Patient's urine culture with GNR.  Started on IV antibiotics.  Noted to have mild nonspecific troponin elevation.  Continue conservative treatment.  No cardiac work-up needed.  Also seen by GI for chronic elevation of LFT.  Labs to rule out PBC were ordered.  The results are still pending.  Okay to DC per GI.  Follow-up with GI as an outpatient.  Patient will be discharged in stable condition.  Patient noted to have GUNNER which resolved." .

## 2023-01-27 NOTE — HPI-PREVIOUS LABS
Lab work dated 18-21 December 2022 demonstrates the following abnormalities: RDW 14.7%, monocytes 14.3%, eosinophils 9.2%, absolute monocytes 1.1, absolute eosinophils 0.7, sodium 132, CO2 19, BUN 33, creatinine 1.10, BUN/creatinine ratio 30.0, total protein 6.2, albumin 3.4, alkaline phosphatase 417, AST 71, ALT 64, GFR 47.8, BNP 3010, troponin 156, urine protein trace, urine ketones 18, urine blood trace, leukocyte esterase trace, urine WBC 3-5, hyaline cast 1-4, CRP 4.0, JACQUIE positive/1:320 titer (nuclear envelope pattern), urine culture <10,000 CFU per milliliter gram-negative rods.  All remaining lab values of CBC, CMP, PT/INR, lactic acid, CK, TSH, ASMA, SARS-CoV-2, influenza panel, RSV and urinalysis are negative or within normal limits. ********** Lab work dated 29 April 2021 demonstrates the following abnormalities: Hemoglobin 11.3, RDW 14.8%, iron >1200, TIBC 222, iron saturation >541%, ferritin 1720.  All remaining lab values of anemia panel are within normal limits. ********** Lab work dated 16 March 2021 demonstrates the following abnormalities: RBC 3.76, hemoglobin 11.0, hematocrit 34.5%, RDW 15%, absolute monocytes 1.1, BUN 34, creatinine 1.19, BUN/creatinine ratio 28.6, alkaline phosphatase 233, AST 60, GFR non--American 42.8, ALT 39.  All remaining lab values of CBC, CMP, PT/INR, troponins, and GI stool panel PCR are negative or within normal limits. ********** Lab work dated 18 June 2020 demonstrates the following abnormalities: Hemoglobin 11.6, hematocrit 34.9%, CO2 21, BUN 19, magnesium 1.5, GFR non-African-American 55.6.  All remaining lab values of CBC and BMP are within normal limits. ********** Stool studies dated 16 June 2020 are negative for infectious etiology. ********** Urinalysis dated 16 June 2020 demonstrates the following abnormalities: Urine color straw, urine protein 30, urine blood small, urine leukocyte esterase large, urine WBC >50, urine RBC 6-10, urine bacteria moderate.  All remaining lab values of urinalysis are negative or within normal limits. .

## 2023-01-27 NOTE — HPI-PREVIOUS IMAGING
CT abdomen and pelvis without contrast/18 December 2022.  No evidence of acute abdominal or pelvic pathology. ********** PIPIDA scan with ejection fraction/08 July 2020.  No scintigraphic evidence of acute or chronic cholecystitis.  Gallbladder ejection fraction is 78%. ********** CT abdomen and pelvis with contrast/16 June 2020.  1.  There is a moderate sized hiatal hernia.  2.  There are a few scattered diverticula in the colon.  There is no definite evidence of diverticulitis. .

## 2023-02-01 ENCOUNTER — TELEPHONE ENCOUNTER (OUTPATIENT)
Dept: URBAN - METROPOLITAN AREA CLINIC 63 | Facility: CLINIC | Age: 88
End: 2023-02-01

## 2023-05-01 ENCOUNTER — LAB OUTSIDE AN ENCOUNTER (OUTPATIENT)
Dept: URBAN - METROPOLITAN AREA CLINIC 63 | Facility: CLINIC | Age: 88
End: 2023-05-01